# Patient Record
Sex: FEMALE | Race: WHITE | NOT HISPANIC OR LATINO | Employment: FULL TIME | URBAN - METROPOLITAN AREA
[De-identification: names, ages, dates, MRNs, and addresses within clinical notes are randomized per-mention and may not be internally consistent; named-entity substitution may affect disease eponyms.]

---

## 2024-06-08 ENCOUNTER — HOSPITAL ENCOUNTER (EMERGENCY)
Facility: HOSPITAL | Age: 34
Discharge: HOME | End: 2024-06-08
Payer: COMMERCIAL

## 2024-06-08 VITALS
HEIGHT: 66 IN | TEMPERATURE: 99 F | BODY MASS INDEX: 27.48 KG/M2 | DIASTOLIC BLOOD PRESSURE: 81 MMHG | WEIGHT: 171 LBS | SYSTOLIC BLOOD PRESSURE: 110 MMHG | OXYGEN SATURATION: 99 % | HEART RATE: 60 BPM | RESPIRATION RATE: 20 BRPM

## 2024-06-08 DIAGNOSIS — S01.81XA FACIAL LACERATION, INITIAL ENCOUNTER: Primary | ICD-10-CM

## 2024-06-08 DIAGNOSIS — E86.0 DEHYDRATION: ICD-10-CM

## 2024-06-08 PROBLEM — I37.1 PULMONARY VALVE INSUFFICIENCY: Status: ACTIVE | Noted: 2017-08-23

## 2024-06-08 LAB
ALBUMIN SERPL BCP-MCNC: 4.6 G/DL (ref 3.5–5.2)
ALP SERPL-CCNC: 58 U/L (ref 55–135)
ALT SERPL W/O P-5'-P-CCNC: 18 U/L (ref 10–44)
ANION GAP SERPL CALC-SCNC: 10 MMOL/L (ref 8–16)
AST SERPL-CCNC: 33 U/L (ref 10–40)
BASOPHILS # BLD AUTO: 0.07 K/UL (ref 0–0.2)
BASOPHILS NFR BLD: 0.8 % (ref 0–1.9)
BILIRUB SERPL-MCNC: 0.6 MG/DL (ref 0.1–1)
BILIRUB UR QL STRIP: ABNORMAL
BUN SERPL-MCNC: 12 MG/DL (ref 6–20)
CALCIUM SERPL-MCNC: 9.5 MG/DL (ref 8.7–10.5)
CHLORIDE SERPL-SCNC: 109 MMOL/L (ref 95–110)
CLARITY UR: CLEAR
CO2 SERPL-SCNC: 18 MMOL/L (ref 23–29)
COLOR UR: YELLOW
CREAT SERPL-MCNC: 0.8 MG/DL (ref 0.5–1.4)
DIFFERENTIAL METHOD BLD: ABNORMAL
EOSINOPHIL # BLD AUTO: 0.1 K/UL (ref 0–0.5)
EOSINOPHIL NFR BLD: 0.9 % (ref 0–8)
ERYTHROCYTE [DISTWIDTH] IN BLOOD BY AUTOMATED COUNT: 12.3 % (ref 11.5–14.5)
EST. GFR  (NO RACE VARIABLE): >60 ML/MIN/1.73 M^2
GLUCOSE SERPL-MCNC: 93 MG/DL (ref 70–110)
GLUCOSE UR QL STRIP: NEGATIVE
HCT VFR BLD AUTO: 43.4 % (ref 37–48.5)
HGB BLD-MCNC: 15.2 G/DL (ref 12–16)
HGB UR QL STRIP: ABNORMAL
IMM GRANULOCYTES # BLD AUTO: 0.04 K/UL (ref 0–0.04)
IMM GRANULOCYTES NFR BLD AUTO: 0.4 % (ref 0–0.5)
KETONES UR QL STRIP: ABNORMAL
LEUKOCYTE ESTERASE UR QL STRIP: NEGATIVE
LYMPHOCYTES # BLD AUTO: 2.1 K/UL (ref 1–4.8)
LYMPHOCYTES NFR BLD: 23.3 % (ref 18–48)
MCH RBC QN AUTO: 31.8 PG (ref 27–31)
MCHC RBC AUTO-ENTMCNC: 35 G/DL (ref 32–36)
MCV RBC AUTO: 91 FL (ref 82–98)
MONOCYTES # BLD AUTO: 0.6 K/UL (ref 0.3–1)
MONOCYTES NFR BLD: 6.8 % (ref 4–15)
NEUTROPHILS # BLD AUTO: 6 K/UL (ref 1.8–7.7)
NEUTROPHILS NFR BLD: 67.8 % (ref 38–73)
NITRITE UR QL STRIP: NEGATIVE
NRBC BLD-RTO: 0 /100 WBC
PH UR STRIP: 6 [PH] (ref 5–8)
PLATELET # BLD AUTO: 256 K/UL (ref 150–450)
PMV BLD AUTO: 9.4 FL (ref 9.2–12.9)
POTASSIUM SERPL-SCNC: 5 MMOL/L (ref 3.5–5.1)
PROT SERPL-MCNC: 8.6 G/DL (ref 6–8.4)
PROT UR QL STRIP: NEGATIVE
RBC # BLD AUTO: 4.78 M/UL (ref 4–5.4)
SODIUM SERPL-SCNC: 137 MMOL/L (ref 136–145)
SP GR UR STRIP: >=1.03 (ref 1–1.03)
URN SPEC COLLECT METH UR: ABNORMAL
UROBILINOGEN UR STRIP-ACNC: 1 EU/DL
WBC # BLD AUTO: 8.92 K/UL (ref 3.9–12.7)

## 2024-06-08 PROCEDURE — 99284 EMERGENCY DEPT VISIT MOD MDM: CPT | Mod: 25

## 2024-06-08 PROCEDURE — 86803 HEPATITIS C AB TEST: CPT | Performed by: STUDENT IN AN ORGANIZED HEALTH CARE EDUCATION/TRAINING PROGRAM

## 2024-06-08 PROCEDURE — 81003 URINALYSIS AUTO W/O SCOPE: CPT | Performed by: NURSE PRACTITIONER

## 2024-06-08 PROCEDURE — 12011 RPR F/E/E/N/L/M 2.5 CM/<: CPT

## 2024-06-08 PROCEDURE — 25000003 PHARM REV CODE 250: Performed by: NURSE PRACTITIONER

## 2024-06-08 PROCEDURE — 87389 HIV-1 AG W/HIV-1&-2 AB AG IA: CPT | Performed by: STUDENT IN AN ORGANIZED HEALTH CARE EDUCATION/TRAINING PROGRAM

## 2024-06-08 PROCEDURE — 80053 COMPREHEN METABOLIC PANEL: CPT | Performed by: NURSE PRACTITIONER

## 2024-06-08 PROCEDURE — 85025 COMPLETE CBC W/AUTO DIFF WBC: CPT | Performed by: NURSE PRACTITIONER

## 2024-06-08 RX ORDER — SULFAMETHOXAZOLE AND TRIMETHOPRIM 800; 160 MG/1; MG/1
1 TABLET ORAL 2 TIMES DAILY
Qty: 14 TABLET | Refills: 0 | Status: SHIPPED | OUTPATIENT
Start: 2024-06-08 | End: 2024-06-15

## 2024-06-08 RX ADMIN — SODIUM CHLORIDE 1000 ML: 9 INJECTION, SOLUTION INTRAVENOUS at 10:06

## 2024-06-09 LAB
HCV AB SERPL QL IA: NORMAL
HIV 1+2 AB+HIV1 P24 AG SERPL QL IA: NORMAL
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
## 2025-05-23 ENCOUNTER — OFFICE VISIT (OUTPATIENT)
Dept: URGENT CARE | Facility: CLINIC | Age: 35
End: 2025-05-23
Payer: COMMERCIAL

## 2025-05-23 VITALS
SYSTOLIC BLOOD PRESSURE: 115 MMHG | BODY MASS INDEX: 23.23 KG/M2 | WEIGHT: 148 LBS | DIASTOLIC BLOOD PRESSURE: 79 MMHG | OXYGEN SATURATION: 99 % | RESPIRATION RATE: 16 BRPM | HEIGHT: 67 IN | TEMPERATURE: 99 F | HEART RATE: 64 BPM

## 2025-05-23 DIAGNOSIS — S92.492A OTHER FRACTURE OF LEFT GREAT TOE, INITIAL ENCOUNTER FOR CLOSED FRACTURE: Primary | ICD-10-CM

## 2025-05-23 DIAGNOSIS — M79.672 LEFT FOOT PAIN: ICD-10-CM

## 2025-05-23 PROCEDURE — 99214 OFFICE O/P EST MOD 30 MIN: CPT | Mod: S$GLB,,, | Performed by: NURSE PRACTITIONER

## 2025-05-23 NOTE — PROGRESS NOTES
"Subjective:       Patient ID: Scarlett Elmore is a 35 y.o. female.    Vitals:  height is 5' 6.5" (1.689 m) and weight is 67.1 kg (148 lb). Her oral temperature is 98.8 °F (37.1 °C). Her blood pressure is 115/79 and her pulse is 64. Her respiration is 16 and oxygen saturation is 99%.     Chief Complaint: Toe Injury    35-year-old female who presents today with chief complaint of left great toe injury.  She explains that she dropped a large 40 lb cat litter box on her left great toe several days ago.    Toe Injury  This is a new problem. The current episode started today. The problem has been gradually worsening. Treatments tried: elevation. The treatment provided moderate relief.       Musculoskeletal:  Positive for pain.   Skin:  Positive for bruising.           Objective:      Physical Exam   Constitutional: She is oriented to person, place, and time.  Non-toxic appearance. She does not appear ill. No distress. normal  HENT:   Head: Normocephalic and atraumatic.   Mouth/Throat: Mucous membranes are moist. Oropharynx is clear.   Eyes: Conjunctivae are normal. Extraocular movement intact   Neck: Neck supple.   Cardiovascular: Normal rate, regular rhythm, normal heart sounds and normal pulses.   Pulmonary/Chest: Effort normal and breath sounds normal.   Abdominal: Normal appearance.   Musculoskeletal: Normal range of motion.         General: Swelling, tenderness and signs of injury present. Normal range of motion.      Comments: There is some mild swelling, tenderness to palpation, and bruising about the left great toe.     Neurological: She is alert and oriented to person, place, and time.   Skin: Skin is warm, dry and not diaphoretic. bruising   Psychiatric: Her behavior is normal.   Vitals reviewed.        Past medical history and current medications reviewed.       Assessment:           1. Other fracture of left great toe, initial encounter for closed fracture    2. Left foot pain              Plan:         Other " fracture of left great toe, initial encounter for closed fracture  -     Walking Boot For Home Use    Left foot pain  -     XR FOOT COMPLETE 3 VIEW LEFT; Future; Expected date: 05/23/2025         INSTRUCTIONS  Rest.  Elevate affected extremity as instructed.  Ice to affected extremity as instructed.  Ibuprofen as needed for pain as directed.  Walking boot as instructed.  Follow up with Orthopedics as scheduled.  In the meantime, return here or go to ER for worsening of symptoms, or for any new symptoms as discussed.

## 2025-05-26 ENCOUNTER — OFFICE VISIT (OUTPATIENT)
Dept: ORTHOPEDICS | Facility: CLINIC | Age: 35
End: 2025-05-26
Payer: COMMERCIAL

## 2025-05-26 DIAGNOSIS — S92.424D NONDISPLACED FRACTURE OF DISTAL PHALANX OF RIGHT GREAT TOE, SUBSEQUENT ENCOUNTER FOR FRACTURE WITH ROUTINE HEALING: Primary | ICD-10-CM

## 2025-05-26 PROCEDURE — 99203 OFFICE O/P NEW LOW 30 MIN: CPT | Mod: S$GLB,,, | Performed by: PHYSICIAN ASSISTANT

## 2025-05-26 PROCEDURE — 99999 PR PBB SHADOW E&M-EST. PATIENT-LVL I: CPT | Mod: PBBFAC,,, | Performed by: PHYSICIAN ASSISTANT

## 2025-05-26 NOTE — PROGRESS NOTES
[facility]  Orthopedic Surgery  Clinic Note      Subjective:   History of Present Illness    CHIEF COMPLAINT:  Patient presents today for follow up R toe injury after a 40 lb cat litter box fell on foot on Friday.    INJURY AND TREATMENT:  She sustained an injury on Friday when a 40 lb cat litter box fell on her foot. She has been managing with ice, elevation, and rest at home. She wears a tall CAM boot provided by urgent care continuously, including while sleeping to prevent her cat from jumping on her foot. No new trauma has occurred since the initial incident. She reports initial black discoloration and bruising of the toe that is now improving. She maintains ability to move the main joint of the affected toe.      ROS:  Musculoskeletal: -joint pain, -muscle pain, +toe pain  Skin: -rash, -lesion, +bruising  Neurological: -numbness, -tingling          Medications: I have reviewed medication list in the chart at the time of this encounter.     Review of patient's allergies indicates:   Allergen Reactions    Latex Rash     local rash      Past Medical History:   Diagnosis Date    PCOS (polycystic ovarian syndrome)     PCOS (polycystic ovarian syndrome)      Past Surgical History:   Procedure Laterality Date    ADENOIDECTOMY      ELBOW SURGERY      left    Gastric Sleeve      HAND SURGERY      left    SEPTOSTOMY      TONSILLECTOMY         Objective:     Physical Exam  Cardiovascular:      Pulses:           Dorsalis pedis pulses are 2+ on the right side.   Musculoskeletal:      Right ankle: Normal.      Left ankle: Normal.      Right foot: Decreased range of motion. Swelling (to great toe) and tenderness present. No laceration or bony tenderness. Normal pulse.      Left foot: Normal.      Comments: Nail intact. No laceration or abrasion. Edema and ecchymosis to right great toe. No felon or paronychia. Ecchymosis slightly extends to lesser toes or distal foot.    Feet:      Right foot:      Toenail Condition:  Right toenails are normal.          Imaging:  I have independently interpreted and reviewed XR of R foot obtained 3 days ago with patient. No new trauma since.    Assessment:       1. Nondisplaced fracture of distal phalanx of right great toe, subsequent encounter for fracture with routine healing       Plan:           Assessment & Plan    IMPRESSION:  - Sustained comminuted fracture of distal phalanx of great toe, closed. Nail intact. No complications.   - XR shows non-displaced comminuted fracture on my independent interpretation.   - No subungual hematoma present requiring trepanation.  - Bruising and swelling expected to resolve in 2-3 weeks.  - Full recovery anticipated in approximately 10 weeks.  - Intraarticular involvement noted, may lead to mild osteoarthritis but not expected to cause significant chronic pain.  - Will continue conservative treatment: post op shoe provided in clinic, rest, ice, compress, elevate, OTC ibuprofen    PATIENT EDUCATION:  - Explained fracture healing process, expected recovery timeline, and potential for mild osteoarthritis due to joint surface involvement.  - Educated on importance of elevation and ice for reducing swelling and pain, including rationale for cold therapy in acute injury phase vs. heat therapy in later stages.    ACTION ITEMS/LIFESTYLE:  - Patient to rest and elevate foot as much as possible.  - Recommend avoiding high-impact activities and prolonged walking distances.  - Patient to progress from heel walking to flat walking as comfort allows.  - Patient to begin range of motion exercises for toes when swelling subsides (in about 2 weeks).  - Recommend using ice therapy for 20 minutes every 4 hours with a cloth barrier to prevent skin burns.  - Patient can consider using a knee scooter for longer trips to minimize discomfort.    MEDICATIONS:  - Started ibuprofen 600-800 mg every 6 hours as needed for pain relief.    FOLLOW UP:  - Follow up in 4 weeks or sooner if  needed.        Future Appointments   Date Time Provider Department Center   6/23/2025  1:30 PM Danika Lewis PA-C [facility] ORTHO [facility] Oremma Lewis PA-C  Orthopedic Surgery  [facility]